# Patient Record
Sex: FEMALE | Race: WHITE | NOT HISPANIC OR LATINO | Employment: UNEMPLOYED | URBAN - METROPOLITAN AREA
[De-identification: names, ages, dates, MRNs, and addresses within clinical notes are randomized per-mention and may not be internally consistent; named-entity substitution may affect disease eponyms.]

---

## 2017-10-20 ENCOUNTER — OFFICE VISIT (OUTPATIENT)
Dept: SURGERY | Facility: CLINIC | Age: 42
End: 2017-10-20

## 2017-10-20 VITALS
TEMPERATURE: 97.5 F | DIASTOLIC BLOOD PRESSURE: 78 MMHG | BODY MASS INDEX: 36.84 KG/M2 | SYSTOLIC BLOOD PRESSURE: 118 MMHG | OXYGEN SATURATION: 96 % | HEART RATE: 101 BPM | WEIGHT: 200.2 LBS | HEIGHT: 62 IN

## 2017-10-20 DIAGNOSIS — C21.0 ANAL CANCER (HCC): Primary | ICD-10-CM

## 2017-10-20 PROCEDURE — 46600 DIAGNOSTIC ANOSCOPY SPX: CPT | Performed by: COLON & RECTAL SURGERY

## 2017-10-20 PROCEDURE — 99244 OFF/OP CNSLTJ NEW/EST MOD 40: CPT | Performed by: COLON & RECTAL SURGERY

## 2017-10-20 RX ORDER — OXYCODONE HYDROCHLORIDE 30 MG/1
30 TABLET, FILM COATED, EXTENDED RELEASE ORAL EVERY 12 HOURS SCHEDULED
COMMUNITY

## 2017-10-20 RX ORDER — PROMETHAZINE HYDROCHLORIDE 25 MG/1
25 TABLET ORAL EVERY 6 HOURS PRN
COMMUNITY

## 2017-10-20 RX ORDER — LORAZEPAM 1 MG/1
1 TABLET ORAL EVERY 8 HOURS PRN
COMMUNITY

## 2017-10-20 RX ORDER — CEFAZOLIN SODIUM 2 G/100ML
2 INJECTION, SOLUTION INTRAVENOUS ONCE
Status: CANCELLED | OUTPATIENT
Start: 2017-10-20 | End: 2017-10-20

## 2017-10-20 RX ORDER — HYDROMORPHONE HYDROCHLORIDE 8 MG/1
8 TABLET ORAL
COMMUNITY

## 2017-10-20 NOTE — PROGRESS NOTES
"Alma Judd is a 42 y.o. 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 Gilmore in [place]: colonoscopy showed 2 cm ulcerated mass, circumferential, at anorectal junction    PET scan 10/13/2016 showed circumferential thickening of the anorectal junction with increased activity.  Small lymph nodes along the iliac vessels, maximum size less than 1 cm in diameter.  Small lymph nodes less than 1 cm and inguinal area bilaterally    Most recent PET/CT scan was after she finished chemoradiation Jan 2017: Showed marked interval decrease in size of the mass and metabolic activity in the region of the anal verge.  No change in the size of the lymph nodes in the right common iliac chain and left internal iliac chain without metabolic activity.  No pathologic lymphadenopathy in the chest    Chemotherapy: mitomycin and 5-FU: 10/25/2016 & 12/8/2016  Kris Green MD medical oncology    Radiation oncologist: Vahid Kelly MD in [place]  [phone]    Had biopsies with Narayan Katz MD 4/21/2017  Anterior anal canal benign mucosa with hyperplastic changes, no carcinoma  Anterior perianal skin: low-grade squamous intraepithelial lesion, no carcinoma  Right vulva: high-grade squamous intraepithelial lesion, extending to edges of biopsy  Right anterior perianal skin: low-grade squamous intraepithelial lesion, no carcinoma    Simple right vulvectomy 7/6/2017 Dr. Nunez: HGSIL with clear " margins.  EUA with fulguration anal dysplasia 2017 Dr. Katz, no biopsies    Gyn/Onc: previously saw Manas Nunez MD @ [facility]    new Gyn/Onc Ly Francisco MD in [place]: has appt for next month  [phone] or [phone]    No past medical history on file.    Past Surgical History:   Procedure Laterality Date   •  SECTION     •  SECTION     •  SECTION     • D&C HYSTEROSCOPY WITH NOVASURE ENDOMETRIAL ABLATION AND MYOSURE     • VULVECTOMY         Social History:   reports that she has been smoking.  She has never used smokeless tobacco. She reports that she drinks alcohol. She reports that she uses illicit drugs, including Hydrocodone.      Marriage status: Single    Family History   Problem Relation Age of Onset   • Cervical cancer Mother    • Lung cancer Father    • Heart attack Father          Current Outpatient Prescriptions:   •  HYDROmorphone (DILAUDID) 8 MG tablet, Take 8 mg by mouth Every 4 (Four) Hours As Needed for Moderate Pain ., Disp: , Rfl:   •  LORazepam (ATIVAN) 1 MG tablet, Take 1 mg by mouth Every 8 (Eight) Hours As Needed for Anxiety., Disp: , Rfl:   •  OxyCODONE HCl ER (oxyCONTIN) 30 MG tablet extended-release 12 hour, Take 30 mg by mouth Every 12 (Twelve) Hours., Disp: , Rfl:   •  promethazine (PHENERGAN) 25 MG tablet, Take 25 mg by mouth Every 6 (Six) Hours As Needed for Nausea or Vomiting., Disp: , Rfl:     Allergy  Review of patient's allergies indicates no known allergies.    Review of Systems   Constitution: Positive for weakness. Negative for decreased appetite and weight gain.   HENT: Negative for congestion, hearing loss and hoarse voice.    Eyes: Positive for blurred vision. Negative for discharge and visual disturbance.        See spots occassionally   Cardiovascular: Positive for chest pain and leg swelling. Negative for cyanosis.        Swelling of hands and feet   Respiratory: Negative.  Negative for cough, hemoptysis, shortness of breath, sleep  disturbances due to breathing, snoring, sputum production and wheezing.    Endocrine: Negative for cold intolerance, heat intolerance, polydipsia, polyphagia and polyuria.        Get cold fast then sweat perfusely   Hematologic/Lymphatic: Negative for adenopathy and bleeding problem. Bruises/bleeds easily.   Skin: Positive for poor wound healing. Negative for itching and skin cancer.        Redness at tips of hands and feet   Musculoskeletal: Positive for back pain. Negative for arthritis, joint pain and joint swelling.        Difficulty walking and sitting    Gastrointestinal: Positive for bloating. Negative for change in bowel habit, bowel incontinence and constipation.        Urge to  have bowel movements    Genitourinary: Positive for dysuria. Negative for bladder incontinence and hematuria.   Neurological: Positive for dizziness, headaches and light-headedness. Negative for brief paralysis, excessive daytime sleepiness and focal weakness.        Numbness in hands and arms and elbows    Psychiatric/Behavioral: Negative for altered mental status and hallucinations. The patient has insomnia.        Vitals:    10/20/17 0852   BP: 118/78   Pulse: 101   Temp: 97.5 °F (36.4 °C)   SpO2: 96%     Body mass index is 36.62 kg/(m^2).    Physical Exam   Constitutional: She is oriented to person, place, and time. She appears well-developed and well-nourished. No distress.   HENT:   Head: Normocephalic and atraumatic.   Nose: Nose normal.   Mouth/Throat: Oropharynx is clear and moist.   Eyes: Conjunctivae and EOM are normal. Pupils are equal, round, and reactive to light.   Neck: Normal range of motion. No tracheal deviation present.   Pulmonary/Chest: Effort normal and breath sounds normal. No respiratory distress.   Abdominal: Soft. Bowel sounds are normal. She exhibits no distension.   Genitourinary:   Genitourinary Comments: Perianal exam: external: wnl.  Well-healed scar  LINDA- decreased tone, 3cm left lateral firm  nodule  Anoscopy performed:   indurated nodule left lateral   Musculoskeletal: Normal range of motion. She exhibits no edema or deformity.   Neurological: She is alert and oriented to person, place, and time. No cranial nerve deficit. Coordination and gait normal.   Skin: Skin is warm and dry.   Psychiatric: She has a normal mood and affect. Her behavior is normal. Judgment normal.       Review of Medical Record:     I reviewed Dr. Kris Green records: anal SCC diagnosed Sept 2016 s/p chemoradiation finished January 2017    Reviewed Dr. Manas Nunez records: simple R vulvectomy July 2017 for HGSIL  Reviewed op note and path from 4 -2017 : no mass , biopsy from anterior anal - neg  Will request records from radiation oncologist Vahid Kelly in [place]    Will request office records from Dr. Oswaldo Katz    Assessment:  1. Anal cancer        Plan:    I recommend transanal biopsy, flexible sigmoidoscopy, and endorectal ultrasound to further characterize anal nodule in patient with history of anal cancer.   I described with patient typical surgical time, postop recovery including pain management, and restrictions. I discussed with patient risks, benefits, and alternatives.  The patient had opportunity to ask questions.  I answered all questions.  Patient understands and wishes to proceed with procedure.  I will make further recommendations pending these results.    Will also request records from radiation oncologist Vahid Kelly in [place] & Dr. Oswaldo Katz      Scribed for Pawel Pickering MD by Linda Paulino PA-C 10/20/2017  This patient was evaluated by me, recommendations made, documentation reviewed, edited, and revised by me, Pawel Pickering MD

## 2017-11-06 ENCOUNTER — ANESTHESIA (OUTPATIENT)
Dept: PERIOP | Facility: HOSPITAL | Age: 42
End: 2017-11-06

## 2017-11-06 ENCOUNTER — HOSPITAL ENCOUNTER (OUTPATIENT)
Facility: HOSPITAL | Age: 42
Setting detail: HOSPITAL OUTPATIENT SURGERY
Discharge: HOME | End: 2017-11-06
Attending: COLON & RECTAL SURGERY | Admitting: COLON & RECTAL SURGERY

## 2017-11-06 ENCOUNTER — ANESTHESIA EVENT (OUTPATIENT)
Dept: PERIOP | Facility: HOSPITAL | Age: 42
End: 2017-11-06

## 2017-11-06 VITALS
HEIGHT: 62 IN | TEMPERATURE: 97.8 F | HEART RATE: 79 BPM | SYSTOLIC BLOOD PRESSURE: 108 MMHG | OXYGEN SATURATION: 97 % | RESPIRATION RATE: 18 BRPM | DIASTOLIC BLOOD PRESSURE: 76 MMHG | WEIGHT: 200.4 LBS | BODY MASS INDEX: 36.88 KG/M2

## 2017-11-06 DIAGNOSIS — C21.0 ANAL CANCER (HCC): ICD-10-CM

## 2017-11-06 LAB
ANION GAP SERPL CALCULATED.3IONS-SCNC: 14.7 MMOL/L
BUN BLD-MCNC: 6 MG/DL (ref 6–20)
BUN/CREAT SERPL: 6.8 (ref 7–25)
CALCIUM SPEC-SCNC: 9.1 MG/DL (ref 8.6–10.5)
CHLORIDE SERPL-SCNC: 99 MMOL/L (ref 98–107)
CO2 SERPL-SCNC: 26.3 MMOL/L (ref 22–29)
CREAT BLD-MCNC: 0.88 MG/DL (ref 0.57–1)
DEPRECATED RDW RBC AUTO: 50.2 FL (ref 37–54)
ERYTHROCYTE [DISTWIDTH] IN BLOOD BY AUTOMATED COUNT: 14.3 % (ref 11.7–13)
GFR SERPL CREATININE-BSD FRML MDRD: 70 ML/MIN/1.73
GLUCOSE BLD-MCNC: 94 MG/DL (ref 65–99)
HCG SERPL QL: NEGATIVE
HCT VFR BLD AUTO: 37 % (ref 35.6–45.5)
HGB BLD-MCNC: 11.7 G/DL (ref 11.9–15.5)
MCH RBC QN AUTO: 30.3 PG (ref 26.9–32)
MCHC RBC AUTO-ENTMCNC: 31.6 G/DL (ref 32.4–36.3)
MCV RBC AUTO: 95.9 FL (ref 80.5–98.2)
PLATELET # BLD AUTO: 349 10*3/MM3 (ref 140–500)
PMV BLD AUTO: 9.2 FL (ref 6–12)
POTASSIUM BLD-SCNC: 3.8 MMOL/L (ref 3.5–5.2)
RBC # BLD AUTO: 3.86 10*6/MM3 (ref 3.9–5.2)
SODIUM BLD-SCNC: 140 MMOL/L (ref 136–145)
WBC NRBC COR # BLD: 9.01 10*3/MM3 (ref 4.5–10.7)

## 2017-11-06 PROCEDURE — 76872 US TRANSRECTAL: CPT | Performed by: COLON & RECTAL SURGERY

## 2017-11-06 PROCEDURE — 88305 TISSUE EXAM BY PATHOLOGIST: CPT | Performed by: COLON & RECTAL SURGERY

## 2017-11-06 PROCEDURE — 25010000002 ONDANSETRON PER 1 MG: Performed by: NURSE ANESTHETIST, CERTIFIED REGISTERED

## 2017-11-06 PROCEDURE — 25010000002 DEXAMETHASONE PER 1 MG: Performed by: NURSE ANESTHETIST, CERTIFIED REGISTERED

## 2017-11-06 PROCEDURE — 25010000002 PROPOFOL 10 MG/ML EMULSION: Performed by: NURSE ANESTHETIST, CERTIFIED REGISTERED

## 2017-11-06 PROCEDURE — 85027 COMPLETE CBC AUTOMATED: CPT | Performed by: COLON & RECTAL SURGERY

## 2017-11-06 PROCEDURE — 45341 SIGMOIDOSCOPY W/ULTRASOUND: CPT | Performed by: COLON & RECTAL SURGERY

## 2017-11-06 PROCEDURE — 84703 CHORIONIC GONADOTROPIN ASSAY: CPT | Performed by: COLON & RECTAL SURGERY

## 2017-11-06 PROCEDURE — 25010000002 FENTANYL CITRATE (PF) 100 MCG/2ML SOLUTION: Performed by: ANESTHESIOLOGY

## 2017-11-06 PROCEDURE — 25010000003 CEFAZOLIN IN DEXTROSE 2-4 GM/100ML-% SOLUTION: Performed by: PHYSICIAN ASSISTANT

## 2017-11-06 PROCEDURE — 25010000002 MIDAZOLAM PER 1 MG: Performed by: ANESTHESIOLOGY

## 2017-11-06 PROCEDURE — 88331 PATH CONSLTJ SURG 1 BLK 1SPC: CPT | Performed by: COLON & RECTAL SURGERY

## 2017-11-06 PROCEDURE — 80048 BASIC METABOLIC PNL TOTAL CA: CPT | Performed by: COLON & RECTAL SURGERY

## 2017-11-06 RX ORDER — PROMETHAZINE HYDROCHLORIDE 25 MG/1
25 SUPPOSITORY RECTAL ONCE AS NEEDED
Status: DISCONTINUED | OUTPATIENT
Start: 2017-11-06 | End: 2017-11-06 | Stop reason: HOSPADM

## 2017-11-06 RX ORDER — HYDROMORPHONE HYDROCHLORIDE 1 MG/ML
0.5 INJECTION, SOLUTION INTRAMUSCULAR; INTRAVENOUS; SUBCUTANEOUS
Status: DISCONTINUED | OUTPATIENT
Start: 2017-11-06 | End: 2017-11-06 | Stop reason: HOSPADM

## 2017-11-06 RX ORDER — FAMOTIDINE 10 MG/ML
20 INJECTION, SOLUTION INTRAVENOUS
Status: DISCONTINUED | OUTPATIENT
Start: 2017-11-06 | End: 2017-11-06 | Stop reason: HOSPADM

## 2017-11-06 RX ORDER — NALOXONE HCL 0.4 MG/ML
0.2 VIAL (ML) INJECTION AS NEEDED
Status: DISCONTINUED | OUTPATIENT
Start: 2017-11-06 | End: 2017-11-06 | Stop reason: HOSPADM

## 2017-11-06 RX ORDER — HYDROCODONE BITARTRATE AND ACETAMINOPHEN 7.5; 325 MG/1; MG/1
1 TABLET ORAL ONCE AS NEEDED
Status: DISCONTINUED | OUTPATIENT
Start: 2017-11-06 | End: 2017-11-06 | Stop reason: HOSPADM

## 2017-11-06 RX ORDER — ONDANSETRON 4 MG/1
4 TABLET, FILM COATED ORAL ONCE AS NEEDED
Status: DISCONTINUED | OUTPATIENT
Start: 2017-11-06 | End: 2017-11-06 | Stop reason: HOSPADM

## 2017-11-06 RX ORDER — SODIUM CHLORIDE, SODIUM LACTATE, POTASSIUM CHLORIDE, CALCIUM CHLORIDE 600; 310; 30; 20 MG/100ML; MG/100ML; MG/100ML; MG/100ML
9 INJECTION, SOLUTION INTRAVENOUS CONTINUOUS PRN
Status: DISCONTINUED | OUTPATIENT
Start: 2017-11-06 | End: 2017-11-06 | Stop reason: HOSPADM

## 2017-11-06 RX ORDER — OXYCODONE HYDROCHLORIDE AND ACETAMINOPHEN 5; 325 MG/1; MG/1
2 TABLET ORAL ONCE AS NEEDED
Status: COMPLETED | OUTPATIENT
Start: 2017-11-06 | End: 2017-11-06

## 2017-11-06 RX ORDER — ONDANSETRON 2 MG/ML
INJECTION INTRAMUSCULAR; INTRAVENOUS AS NEEDED
Status: DISCONTINUED | OUTPATIENT
Start: 2017-11-06 | End: 2017-11-06 | Stop reason: SURG

## 2017-11-06 RX ORDER — MIDAZOLAM HYDROCHLORIDE 1 MG/ML
1 INJECTION INTRAMUSCULAR; INTRAVENOUS
Status: DISCONTINUED | OUTPATIENT
Start: 2017-11-06 | End: 2017-11-06 | Stop reason: HOSPADM

## 2017-11-06 RX ORDER — SODIUM CHLORIDE 0.9 % (FLUSH) 0.9 %
1-10 SYRINGE (ML) INJECTION AS NEEDED
Status: DISCONTINUED | OUTPATIENT
Start: 2017-11-06 | End: 2017-11-06 | Stop reason: HOSPADM

## 2017-11-06 RX ORDER — LABETALOL HYDROCHLORIDE 5 MG/ML
5 INJECTION, SOLUTION INTRAVENOUS
Status: DISCONTINUED | OUTPATIENT
Start: 2017-11-06 | End: 2017-11-06 | Stop reason: HOSPADM

## 2017-11-06 RX ORDER — FENTANYL CITRATE 50 UG/ML
25 INJECTION, SOLUTION INTRAMUSCULAR; INTRAVENOUS
Status: DISCONTINUED | OUTPATIENT
Start: 2017-11-06 | End: 2017-11-06 | Stop reason: HOSPADM

## 2017-11-06 RX ORDER — PROPOFOL 10 MG/ML
VIAL (ML) INTRAVENOUS AS NEEDED
Status: DISCONTINUED | OUTPATIENT
Start: 2017-11-06 | End: 2017-11-06 | Stop reason: SURG

## 2017-11-06 RX ORDER — DIPHENHYDRAMINE HYDROCHLORIDE 50 MG/ML
12.5 INJECTION INTRAMUSCULAR; INTRAVENOUS
Status: DISCONTINUED | OUTPATIENT
Start: 2017-11-06 | End: 2017-11-06 | Stop reason: HOSPADM

## 2017-11-06 RX ORDER — DEXAMETHASONE SODIUM PHOSPHATE 10 MG/ML
INJECTION INTRAMUSCULAR; INTRAVENOUS AS NEEDED
Status: DISCONTINUED | OUTPATIENT
Start: 2017-11-06 | End: 2017-11-06 | Stop reason: SURG

## 2017-11-06 RX ORDER — OXYCODONE AND ACETAMINOPHEN 7.5; 325 MG/1; MG/1
1 TABLET ORAL ONCE AS NEEDED
Status: DISCONTINUED | OUTPATIENT
Start: 2017-11-06 | End: 2017-11-06 | Stop reason: HOSPADM

## 2017-11-06 RX ORDER — FENTANYL CITRATE 50 UG/ML
50 INJECTION, SOLUTION INTRAMUSCULAR; INTRAVENOUS
Status: DISCONTINUED | OUTPATIENT
Start: 2017-11-06 | End: 2017-11-06 | Stop reason: HOSPADM

## 2017-11-06 RX ORDER — HYDRALAZINE HYDROCHLORIDE 20 MG/ML
5 INJECTION INTRAMUSCULAR; INTRAVENOUS
Status: DISCONTINUED | OUTPATIENT
Start: 2017-11-06 | End: 2017-11-06 | Stop reason: HOSPADM

## 2017-11-06 RX ORDER — PROMETHAZINE HYDROCHLORIDE 25 MG/1
12.5 TABLET ORAL ONCE AS NEEDED
Status: DISCONTINUED | OUTPATIENT
Start: 2017-11-06 | End: 2017-11-06 | Stop reason: HOSPADM

## 2017-11-06 RX ORDER — PROMETHAZINE HYDROCHLORIDE 25 MG/ML
12.5 INJECTION, SOLUTION INTRAMUSCULAR; INTRAVENOUS ONCE AS NEEDED
Status: DISCONTINUED | OUTPATIENT
Start: 2017-11-06 | End: 2017-11-06 | Stop reason: HOSPADM

## 2017-11-06 RX ORDER — CEFAZOLIN SODIUM 2 G/100ML
2 INJECTION, SOLUTION INTRAVENOUS ONCE
Status: COMPLETED | OUTPATIENT
Start: 2017-11-06 | End: 2017-11-06

## 2017-11-06 RX ORDER — FLUMAZENIL 0.1 MG/ML
0.2 INJECTION INTRAVENOUS AS NEEDED
Status: DISCONTINUED | OUTPATIENT
Start: 2017-11-06 | End: 2017-11-06 | Stop reason: HOSPADM

## 2017-11-06 RX ORDER — ONDANSETRON 2 MG/ML
4 INJECTION INTRAMUSCULAR; INTRAVENOUS ONCE AS NEEDED
Status: DISCONTINUED | OUTPATIENT
Start: 2017-11-06 | End: 2017-11-06 | Stop reason: HOSPADM

## 2017-11-06 RX ORDER — EPHEDRINE SULFATE 50 MG/ML
5 INJECTION, SOLUTION INTRAVENOUS ONCE AS NEEDED
Status: DISCONTINUED | OUTPATIENT
Start: 2017-11-06 | End: 2017-11-06 | Stop reason: HOSPADM

## 2017-11-06 RX ORDER — MIDAZOLAM HYDROCHLORIDE 1 MG/ML
2 INJECTION INTRAMUSCULAR; INTRAVENOUS
Status: DISCONTINUED | OUTPATIENT
Start: 2017-11-06 | End: 2017-11-06 | Stop reason: HOSPADM

## 2017-11-06 RX ORDER — PROMETHAZINE HYDROCHLORIDE 25 MG/1
25 TABLET ORAL ONCE AS NEEDED
Status: DISCONTINUED | OUTPATIENT
Start: 2017-11-06 | End: 2017-11-06 | Stop reason: HOSPADM

## 2017-11-06 RX ADMIN — SODIUM CHLORIDE, POTASSIUM CHLORIDE, SODIUM LACTATE AND CALCIUM CHLORIDE 9 ML/HR: 600; 310; 30; 20 INJECTION, SOLUTION INTRAVENOUS at 12:24

## 2017-11-06 RX ADMIN — FENTANYL CITRATE 50 MCG: 50 INJECTION INTRAMUSCULAR; INTRAVENOUS at 12:46

## 2017-11-06 RX ADMIN — FENTANYL CITRATE 50 MCG: 50 INJECTION INTRAMUSCULAR; INTRAVENOUS at 13:01

## 2017-11-06 RX ADMIN — CEFAZOLIN SODIUM 2 G: 2 INJECTION, SOLUTION INTRAVENOUS at 12:42

## 2017-11-06 RX ADMIN — OXYCODONE HYDROCHLORIDE AND ACETAMINOPHEN 2 TABLET: 5; 325 TABLET ORAL at 15:05

## 2017-11-06 RX ADMIN — FAMOTIDINE 20 MG: 10 INJECTION INTRAVENOUS at 12:25

## 2017-11-06 RX ADMIN — ONDANSETRON 4 MG: 2 INJECTION INTRAMUSCULAR; INTRAVENOUS at 13:19

## 2017-11-06 RX ADMIN — DEXAMETHASONE SODIUM PHOSPHATE 8 MG: 10 INJECTION INTRAMUSCULAR; INTRAVENOUS at 13:07

## 2017-11-06 RX ADMIN — FENTANYL CITRATE 25 MCG: 50 INJECTION INTRAMUSCULAR; INTRAVENOUS at 12:25

## 2017-11-06 RX ADMIN — MIDAZOLAM 2 MG: 1 INJECTION INTRAMUSCULAR; INTRAVENOUS at 12:25

## 2017-11-06 RX ADMIN — PROPOFOL 200 MG: 10 INJECTION, EMULSION INTRAVENOUS at 12:46

## 2017-11-06 RX ADMIN — METRONIDAZOLE 500 MG: 500 INJECTION, SOLUTION INTRAVENOUS at 11:49

## 2017-11-06 NOTE — PLAN OF CARE
Problem: Patient Care Overview (Adult)  Goal: Plan of Care Review  Outcome: Ongoing (interventions implemented as appropriate)  Pt resting comfortably in bed, pt stable. Transfer to seated recovery for discharge    Problem: Perioperative Period (Adult)  Goal: Signs and Symptoms of Listed Potential Problems Will be Absent or Manageable (Perioperative Period)  Outcome: Ongoing (interventions implemented as appropriate)

## 2017-11-06 NOTE — ANESTHESIA PREPROCEDURE EVALUATION
Anesthesia Evaluation     Patient summary reviewed   NPO Solid Status: > 8 hours       Airway   Mallampati: I  Neck ROM: full  Dental - normal exam     Pulmonary    Cardiovascular     Rhythm: regular        Neuro/Psych  GI/Hepatic/Renal/Endo      Musculoskeletal     Abdominal    Substance History      OB/GYN          Other      history of cancer active      Other Comment: Hb 11.6                                  Anesthesia Plan    ASA 3     general     intravenous induction   Anesthetic plan and risks discussed with patient.  Use of blood products discussed with patient .

## 2017-11-06 NOTE — DISCHARGE INSTRUCTIONS
Outpatient Surgery Guidelines, Adult  DR. HARE WILL CALL YOU TOMORROW.SEDATION DISCHARGE INSTRUCTIONS.    IMPORTANT: The following information will help you return to your best level of health.  Sedation.  You have had a procedure that called for some medicine to reduce anxiety and pain. This medicine (or medicines) is called  sedation. After receiving the medicine, you may be sleepy, but able to breathe on your own. The effects of the medicine may last for several hours.    Follow these instructions after sedation:   Go right home. Rest quietly at home today, then you can be up and about.   Do not drink alcohol, drive or operate machinery for 24 hours.   Do not do anything where light-headedness or clumsiness would be dangerous.   Do not make important decisions or sign any legal papers for the next 24 hours.   Make sure A RESPONSIBLE PERSON stays with you the rest of today and overnight for your protection and safety.   Start your diet with fluids and light foods (jello, soup, juice, toast). Then, slowly progress to your usual diet if you are not sick to your stomach.    Call your doctor if you have:   a gray or blue skin color.   excess sleepiness.   repeated vomiting.   trouble breathing.   any new problems or concerns.  Outpatient procedures are those for which the person having the procedure is allowed to go home the same day as the procedure. Various procedures are done on an outpatient basis. You should follow some general guidelines if you will be having an outpatient procedure.  AFTER THE  PROCEDURE  After surgery, you will be taken to a recovery area, where your progress will be monitored. If there are no complications, you will be allowed to go home when you are awake, stable, and taking fluids well. You may have numbness around the surgical site. Healing will take some time. You will have tenderness at the surgical site and may have some swelling and bruising. You may also have some nausea.  HOME CARE  INSTRUCTIONS  · Do not drive for 24 hours, or as directed by your health care provider. Do not drive while taking prescription pain medicines.  · Do not drink alcohol for 24 hours.  · Do not make important decisions or sign legal documents for 24 hours.  · Plan on having a responsible adult stay with your for 24 hours following your procedure.  · You may resume a normal diet and activities as directed.  · Do not lift anything heavier than 10 pounds (4.5 kg) or play contact sports until your health care provider says it is okay.  · Only take over-the-counter or prescription medicines as directed by your health care provider.  · Follow up with your health care provider as directed.  SEEK MEDICAL CARE IF:  · You have increased bleeding (more than a small spot) from the surgical site.  · You have redness, swelling, or increasing pain in the wound.  · You see pus coming from the wound.  · You have a fever > 101.  · You notice a bad smell coming from the wound or dressing.  · You feel lightheaded or faint.  · You develop a rash.  · You have trouble breathing.  · You develop allergies.  MAKE SURE YOU:  · Understand these instructions.  · Will watch your condition.  · Will get help right away if you are not doing well or get worse.          Dr. Pawel Pickering  [address] [facility] [address] [place] [address]  [phone]      Discharge Instructions for Hemorrhoidectomy/Anal Fissures/Fistulas      1. Go home, rest and take it easy today; however, you should get up and move about several times today to reduce the risk of developing a clot in your legs.      2. You may experience some dizziness or memory loss from the anesthesia.  This may last for the next 24 hours.  Someone should plan on staying with you for the first 24 hours for your safety.    3. Do not make any important legal decisions or sign any legal papers for the next 24 hours.      4. Eat and drink lightly today.  Start off with liquids, jello, soup, crackers or  other bland foods at first. Please drink plenty of fluids. You may advance your diet tomorrow as tolerated as long as you do not experience any nausea or vomiting.     5. Some patients will have packing in their rectum.  It should come out will your first bowel movement.  You may remove it sooner yourself if it bothers you.  If it comes out on its own before your first bowel movement, do not worry about it.  It does not need to be replaced.      6. Begin your sitz baths tomorrow.    7. The best method of pain relief is a sitz bath (sitting in a tub or warm water) at least 3 times daily for 10 minutes.  This helps to reduce pain and aids with hygiene/drainage.  The drainage may have an unpleasant odor.  This is not unexpected and should be controlled with baths and showers.  If the skin around the anal area becomes irritated, you may apply Vaseline, A&D ointment or a similar barrier cream to the area.       8. Bleeding and drainage are to be expected and may persist for as long as 2-4 weeks.  Bleeding may occur with your bowel movements as well.  Wear a cotton liner such as a Kotex pad or a panty liner inside your underwear to protect your clothing.       9. You have received a prescription for a narcotic pain medicine, as you will have pain following surgery.   You will not be totally pain free, but your pain medicine should make the pain tolerable.  Please take your pain medicine as prescribed and always take your pills with food to prevent nausea. Your pain may persist for 1-3 weeks. If you are having severe pain that cannot be controlled by the pain medicine, please contact me.  Typically, patients with anal fissures will have less pain than those with hemorrhoids.      10. The goal is for your bowel movements to be soft which will help to minimize pain.  The pain medicine used to keep your comfortable may also cause some constipation so I recommend the following:    • Miralax (17 grams)--1 capfull every day  starting the day after surgery.    • Keep taking fiber everyday (Citrucel, Metamucil, or Fiber-con) as directed.    11. If you are unable to have a bowel movement by 2 days after surgery, try Milk of Magnesia, Magnesium Citrate, or Colace.  If still unable to have a bowel movement, call the office at [phone]      12. No driving for 24 hours and for as long as you are taking your prescription pain medicine.  You may resume your activities gradually.       13. You will need to call the office at [phone] to schedule a follow-up appointment in 10-21 days.    14. Remember to contact me for any of the following:    • Fever > 100.5 degrees  • Severe pain that cannot be controlled by taking your pain pills  • Severe nausea or vomiting   • Significant bleeding > 1/4 cup  • Any other questions or concerns

## 2017-11-06 NOTE — NURSING NOTE
DR. HARE IN TO DISCUSS PROCEDURE RESULTS AND PLAN OF CARE C PT AND FAMILY.  EXPLAINED NEED FOR RECONSTRUCTION SURGERY.  PT AND FAMILY TEARFUL, BUT ACCEPTING.  NO NEW ORDERS. DR. HARE WILL CALL PT TOMORROW

## 2017-11-06 NOTE — PLAN OF CARE
Problem: Patient Care Overview (Adult)  Goal: Plan of Care Review  Outcome: Ongoing (interventions implemented as appropriate)    11/06/17 1203   Coping/Psychosocial Response Interventions   Plan Of Care Reviewed With patient   Patient Care Overview   Progress no change       Goal: Adult Individualization and Mutuality  Outcome: Ongoing (interventions implemented as appropriate)    11/06/17 1203   Individualization   Patient Specific Preferences call [place]   Patient Specific Goals get rid of pain in my rectum   Patient Specific Interventions teachS&S of infection   Mutuality/Individual Preferences   What Anxieties, Fears or Concerns Do You Have About Your Health or Care? none   What Questions Do You Have About Your Health or Care? none   What Information Would Help Us Give You More Personalized Care? none       Goal: Discharge Needs Assessment  Outcome: Ongoing (interventions implemented as appropriate)    11/06/17 1200 11/06/17 1203   Discharge Needs Assessment   Concerns To Be Addressed --  denies needs/concerns at this time   Readmission Within The Last 30 Days --  no previous admission in last 30 days   Equipment Needed After Discharge --  none   Current Health   Anticipated Changes Related to Illness --  none   Living Environment   Transportation Available car;family or friend will provide --    Self-Care   Equipment Currently Used at Home none --          Problem: Perioperative Period (Adult)  Goal: Signs and Symptoms of Listed Potential Problems Will be Absent or Manageable (Perioperative Period)  Outcome: Ongoing (interventions implemented as appropriate)    11/06/17 1203   Perioperative Period   Problems Assessed (Perioperative Period) pain;hypoxia/hypoxemia;situational response   Problems Present (Perioperative Period) pain

## 2017-11-06 NOTE — OP NOTE
11/06/17         Surgeon: Pawel Pickering MD    Surgical  Assistant: Linda Paulino PA-C     Preoperative diagnosis: h/o Anal cancer with anal mass [C21.0]    Post-Op Diagnosis Codes:   same  [C21.0]    Procedure: RECTAL MASS BIOPSY, SIGMOIDOSCOPY FLEXIBLE, ENDOSCOPIC ULTRASOUND (LOWER), * Panel 2 does not exist *    Estimated Blood Loss: minimal    Specimens:   ID Type Source Tests Collected by Time Destination   A : left anterior lateral anal and rectal mass Tissue Anus TISSUE EXAM Pawel Pickering MD 11/6/2017 1320    B : left anterior lateral anal and rectal mass Tissue Anus TISSUE EXAM Pawel Pickering MD 11/6/2017 1323               INDICATIONS:  This is a 42-year-old female who has had anal cancer and had Carolyn protocol and comes in with a nodule in the rectum. She understands risks, benefits, and alternatives and wishes to proceed.      DESCRIPTION OF PROCEDURE:  The patient is brought into the operating room. SCDs in place.  Antibiotics infused. After general anesthesia was achieved, the patient was placed in candy cane lithotomy.  First did a flexible sigmoidoscopy.  See Provation for details and OP note.  Next, an endorectal ultrasound was done.  See separate operative note. Then did an exam under anesthesia with biopsy of this rectal mass.  Using the lighted Hill-Cevallos, placed these in the anal canal. In the left anterior position is an area of abnormal mucosa and at the dentate line there is what appears to be recurrence of the anal cancer.  The mass appears to be submucosal and I took scalpel to take multiple biopsies. I sent some off as frozen and while still in the operating room pathology called in to say that most of the tissue is necrotic, but he does see evasive squamous carcinoma which would indicate a recurrence. I sent more biopsies of the area in order to solidify the diagnosis. I made sure that there was good hemostasis.  All instrument, lap counts, and needle counts were correct. I did  use 1% Lidocaine with epinephrine and 0.25% Marcaine without as a perineum and anal block.

## 2017-11-06 NOTE — H&P (VIEW-ONLY)
"Alma Judd is a 42 y.o. 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 Gilmore in [place]: colonoscopy showed 2 cm ulcerated mass, circumferential, at anorectal junction    PET scan 10/13/2016 showed circumferential thickening of the anorectal junction with increased activity.  Small lymph nodes along the iliac vessels, maximum size less than 1 cm in diameter.  Small lymph nodes less than 1 cm and inguinal area bilaterally    Most recent PET/CT scan was after she finished chemoradiation Jan 2017: Showed marked interval decrease in size of the mass and metabolic activity in the region of the anal verge.  No change in the size of the lymph nodes in the right common iliac chain and left internal iliac chain without metabolic activity.  No pathologic lymphadenopathy in the chest    Chemotherapy: mitomycin and 5-FU: 10/25/2016 & 12/8/2016  Kris Green MD medical oncology    Radiation oncologist: Vahid Kelly MD in [place]  [phone]    Had biopsies with Narayan Katz MD 4/21/2017  Anterior anal canal benign mucosa with hyperplastic changes, no carcinoma  Anterior perianal skin: low-grade squamous intraepithelial lesion, no carcinoma  Right vulva: high-grade squamous intraepithelial lesion, extending to edges of biopsy  Right anterior perianal skin: low-grade squamous intraepithelial lesion, no carcinoma    Simple right vulvectomy 7/6/2017 Dr. Nunez: HGSIL with clear " margins.  EUA with fulguration anal dysplasia 2017 Dr. Katz, no biopsies    Gyn/Onc: previously saw Manas Nunez MD @ [facility]    new Gyn/Onc Ly Francisco MD in [place]: has appt for next month  [phone] or [phone]    No past medical history on file.    Past Surgical History:   Procedure Laterality Date   •  SECTION     •  SECTION     •  SECTION     • D&C HYSTEROSCOPY WITH NOVASURE ENDOMETRIAL ABLATION AND MYOSURE     • VULVECTOMY         Social History:   reports that she has been smoking.  She has never used smokeless tobacco. She reports that she drinks alcohol. She reports that she uses illicit drugs, including Hydrocodone.      Marriage status: Single    Family History   Problem Relation Age of Onset   • Cervical cancer Mother    • Lung cancer Father    • Heart attack Father          Current Outpatient Prescriptions:   •  HYDROmorphone (DILAUDID) 8 MG tablet, Take 8 mg by mouth Every 4 (Four) Hours As Needed for Moderate Pain ., Disp: , Rfl:   •  LORazepam (ATIVAN) 1 MG tablet, Take 1 mg by mouth Every 8 (Eight) Hours As Needed for Anxiety., Disp: , Rfl:   •  OxyCODONE HCl ER (oxyCONTIN) 30 MG tablet extended-release 12 hour, Take 30 mg by mouth Every 12 (Twelve) Hours., Disp: , Rfl:   •  promethazine (PHENERGAN) 25 MG tablet, Take 25 mg by mouth Every 6 (Six) Hours As Needed for Nausea or Vomiting., Disp: , Rfl:     Allergy  Review of patient's allergies indicates no known allergies.    Review of Systems   Constitution: Positive for weakness. Negative for decreased appetite and weight gain.   HENT: Negative for congestion, hearing loss and hoarse voice.    Eyes: Positive for blurred vision. Negative for discharge and visual disturbance.        See spots occassionally   Cardiovascular: Positive for chest pain and leg swelling. Negative for cyanosis.        Swelling of hands and feet   Respiratory: Negative.  Negative for cough, hemoptysis, shortness of breath, sleep  disturbances due to breathing, snoring, sputum production and wheezing.    Endocrine: Negative for cold intolerance, heat intolerance, polydipsia, polyphagia and polyuria.        Get cold fast then sweat perfusely   Hematologic/Lymphatic: Negative for adenopathy and bleeding problem. Bruises/bleeds easily.   Skin: Positive for poor wound healing. Negative for itching and skin cancer.        Redness at tips of hands and feet   Musculoskeletal: Positive for back pain. Negative for arthritis, joint pain and joint swelling.        Difficulty walking and sitting    Gastrointestinal: Positive for bloating. Negative for change in bowel habit, bowel incontinence and constipation.        Urge to  have bowel movements    Genitourinary: Positive for dysuria. Negative for bladder incontinence and hematuria.   Neurological: Positive for dizziness, headaches and light-headedness. Negative for brief paralysis, excessive daytime sleepiness and focal weakness.        Numbness in hands and arms and elbows    Psychiatric/Behavioral: Negative for altered mental status and hallucinations. The patient has insomnia.        Vitals:    10/20/17 0852   BP: 118/78   Pulse: 101   Temp: 97.5 °F (36.4 °C)   SpO2: 96%     Body mass index is 36.62 kg/(m^2).    Physical Exam   Constitutional: She is oriented to person, place, and time. She appears well-developed and well-nourished. No distress.   HENT:   Head: Normocephalic and atraumatic.   Nose: Nose normal.   Mouth/Throat: Oropharynx is clear and moist.   Eyes: Conjunctivae and EOM are normal. Pupils are equal, round, and reactive to light.   Neck: Normal range of motion. No tracheal deviation present.   Pulmonary/Chest: Effort normal and breath sounds normal. No respiratory distress.   Abdominal: Soft. Bowel sounds are normal. She exhibits no distension.   Genitourinary:   Genitourinary Comments: Perianal exam: external: wnl.  Well-healed scar  LINDA- decreased tone, 3cm left lateral firm  nodule  Anoscopy performed:   indurated nodule left lateral   Musculoskeletal: Normal range of motion. She exhibits no edema or deformity.   Neurological: She is alert and oriented to person, place, and time. No cranial nerve deficit. Coordination and gait normal.   Skin: Skin is warm and dry.   Psychiatric: She has a normal mood and affect. Her behavior is normal. Judgment normal.       Review of Medical Record:     I reviewed Dr. Kris Green records: anal SCC diagnosed Sept 2016 s/p chemoradiation finished January 2017    Reviewed Dr. Manas Nunez records: simple R vulvectomy July 2017 for HGSIL  Reviewed op note and path from 4 -2017 : no mass , biopsy from anterior anal - neg  Will request records from radiation oncologist Vahid Kelly in [place]    Will request office records from Dr. Oswaldo Katz    Assessment:  1. Anal cancer        Plan:    I recommend transanal biopsy, flexible sigmoidoscopy, and endorectal ultrasound to further characterize anal nodule in patient with history of anal cancer.   I described with patient typical surgical time, postop recovery including pain management, and restrictions. I discussed with patient risks, benefits, and alternatives.  The patient had opportunity to ask questions.  I answered all questions.  Patient understands and wishes to proceed with procedure.  I will make further recommendations pending these results.    Will also request records from radiation oncologist Vahid Kelly in [place] & Dr. Oswaldo Katz      Scribed for Pawel Pickering MD by Linda Paulino PA-C 10/20/2017  This patient was evaluated by me, recommendations made, documentation reviewed, edited, and revised by me, Pawel Pickering MD

## 2017-11-06 NOTE — PLAN OF CARE
Problem: Patient Care Overview (Adult)  Goal: Plan of Care Review  Outcome: Outcome(s) achieved Date Met:  11/06/17 11/06/17 5428   Coping/Psychosocial Response Interventions   Plan Of Care Reviewed With patient;spouse;daughter   Patient Care Overview   Progress improving   Outcome Evaluation   Outcome Summary/Follow up Plan ready for discharge

## 2017-11-06 NOTE — OP NOTE
11/06/17      Pre-and postoperative diagnosis: h/o anal cancer s/p gillian protocol with rectal mass    Surgeon: Pawel Pickering MD    Indication:  Patient is a 42 y.o. female who comes for evaluation     Procedure: Endorectal ultrasound    Patient was in the supine lithotomy position with copious K-Y jelly endorectal ultrasound wand was placed in the anal canal.Tumor identified in left anterior position at dentate line going cephalad for 6 cm.  Balloon was inflated . Multiple images were obtained.  Tumor is 1-3 cm in thickness and is close to invading the posterior vagina.  Lymph nodes were identified.    t3 n1 anal cancer in the ano-rectum in the Left anterior position.

## 2017-11-06 NOTE — PLAN OF CARE
Problem: Perioperative Period (Adult)  Goal: Signs and Symptoms of Listed Potential Problems Will be Absent or Manageable (Perioperative Period)  Outcome: Outcome(s) achieved Date Met:  11/06/17

## 2017-11-06 NOTE — PLAN OF CARE
Problem: Patient Care Overview (Adult)  Goal: Discharge Needs Assessment  Outcome: Outcome(s) achieved Date Met:  11/06/17 11/06/17 8312   Discharge Needs Assessment   Concerns To Be Addressed decision making concerns

## 2017-11-06 NOTE — ANESTHESIA POSTPROCEDURE EVALUATION
"Patient: Alma Judd    Procedure Summary     Date Anesthesia Start Anesthesia Stop Room / Location    11/06/17 [id number]  [facility] OR 05 / [facility] MAIN OR       Procedure Diagnosis Surgeon Provider    RECTAL MASS BIOPSY (N/A Rectum); SIGMOIDOSCOPY FLEXIBLE (N/A ); ENDOSCOPIC ULTRASOUND (LOWER) (N/A Rectum) Anal cancer  (Anal cancer [C21.0]) MD Krishna Ortez MD          Anesthesia Type: general  Last vitals  BP   130/80 (11/06/17 1530)   Temp   36.6 °C (97.8 °F) (11/06/17 1530)   Pulse   81 (11/06/17 1530)   Resp   16 (11/06/17 1530)     SpO2   97 % (11/06/17 1530)     Post Anesthesia Care and Evaluation    Patient location during evaluation: PACU  Patient participation: complete - patient participated  Level of consciousness: awake and alert  Pain score: 0  Pain management: adequate  Airway patency: patent  Anesthetic complications: No anesthetic complications    Cardiovascular status: acceptable  Respiratory status: acceptable  Hydration status: acceptable    Comments: /80 (BP Location: Right arm, Patient Position: Lying)  Pulse 81  Temp 36.6 °C (97.8 °F) (Oral)   Resp 16  Ht 62\" (157.5 cm)  Wt 200 lb 6.4 oz (90.9 kg)  SpO2 97%  BMI 36.65 kg/m2      "

## 2017-11-06 NOTE — PLAN OF CARE
Problem: Patient Care Overview (Adult)  Goal: Adult Individualization and Mutuality  Outcome: Outcome(s) achieved Date Met:  11/06/17

## 2017-11-07 LAB
CYTO UR: NORMAL
LAB AP CASE REPORT: NORMAL
Lab: NORMAL
Lab: NORMAL
PATH REPORT.FINAL DX SPEC: NORMAL
PATH REPORT.GROSS SPEC: NORMAL

## (undated) DEVICE — PREMIUM WET SKIN PREP TRAY: Brand: MEDLINE INDUSTRIES, INC.

## (undated) DEVICE — SIGMOIDOSCOPIC SUCTION INSTRUMENT 18 FR W/WINGED CAP CONTROL AND 6 FOOT (1.8M) TUBING: Brand: SIGMOIDOSCOPIC

## (undated) DEVICE — THE TORRENT IRRIGATION SCOPE CONNECTOR IS USED WITH THE TORRENT IRRIGATION TUBING TO PROVIDE IRRIGATION FLUIDS SUCH AS STERILE WATER DURING GASTROINTESTINAL ENDOSCOPIC PROCEDURES WHEN USED IN CONJUNCTION WITH AN IRRIGATION PUMP (OR ELECTROSURGICAL UNIT).: Brand: TORRENT

## (undated) DEVICE — TUBING, SUCTION, 1/4" X 10', STRAIGHT: Brand: MEDLINE

## (undated) DEVICE — ST EXT IV SMARTSITE 2PC M LL 2.8ML 20IN

## (undated) DEVICE — STERILE LATEX POWDER-FREE SURGICAL GLOVESWITH NITRILE COATING: Brand: PROTEXIS

## (undated) DEVICE — DRAPE,UTILITY,TAPE,15X26,STERILE: Brand: MEDLINE

## (undated) DEVICE — GOWN,PREVENTION PLUS,XLARGE,STERILE: Brand: MEDLINE

## (undated) DEVICE — GLV SURG BIOGEL LTX PF 7

## (undated) DEVICE — PREP SOL POVIDONE/IODINE BT 4OZ

## (undated) DEVICE — SPNG LAP 18X18IN LF STRL PK/5

## (undated) DEVICE — Device: Brand: DEFENDO AIR/WATER/SUCTION AND BIOPSY VALVE

## (undated) DEVICE — GLV SURG SENSICARE GREEN W/ALOE PF LF 7 STRL

## (undated) DEVICE — LOU MINOR PROCEDURE: Brand: MEDLINE INDUSTRIES, INC.

## (undated) DEVICE — SYR LUERLOK 20CC

## (undated) DEVICE — PANTY KNIT MATERN L/XL

## (undated) DEVICE — CANN NASL CO2 TRULINK W/O2 A/

## (undated) DEVICE — GLV SURG SENSICARE ALOE LF PF SZ7.5 GRN

## (undated) DEVICE — TBG 02 CRUSH RESIST LF CLR 7FT

## (undated) DEVICE — NDL HYPO ECLPS SFTY 22G 1 1/2IN

## (undated) DEVICE — SUT VIC 4/0 SH 27IN J415H

## (undated) DEVICE — IRRIGATOR BULB ASEPTO 60CC STRL

## (undated) DEVICE — SPNG GZ WOVN 4X4IN 12PLY 10/BX STRL